# Patient Record
Sex: FEMALE | Race: WHITE | Employment: UNEMPLOYED | URBAN - METROPOLITAN AREA
[De-identification: names, ages, dates, MRNs, and addresses within clinical notes are randomized per-mention and may not be internally consistent; named-entity substitution may affect disease eponyms.]

---

## 2023-02-17 ENCOUNTER — HOSPITAL ENCOUNTER (OUTPATIENT)
Facility: HOSPITAL | Age: 28
Setting detail: OUTPATIENT SURGERY
Discharge: HOME | End: 2023-02-17
Attending: INTERNAL MEDICINE | Admitting: INTERNAL MEDICINE
Payer: COMMERCIAL

## 2023-02-17 VITALS
RESPIRATION RATE: 14 BRPM | HEART RATE: 76 BPM | HEIGHT: 61 IN | DIASTOLIC BLOOD PRESSURE: 56 MMHG | TEMPERATURE: 98.2 F | BODY MASS INDEX: 26.75 KG/M2 | WEIGHT: 141.7 LBS | OXYGEN SATURATION: 100 % | SYSTOLIC BLOOD PRESSURE: 97 MMHG

## 2023-02-17 PROCEDURE — 2709999900 HC NON-CHARGEABLE SUPPLY: Performed by: INTERNAL MEDICINE

## 2023-02-17 PROCEDURE — 7100000010 HC PHASE II RECOVERY - FIRST 15 MIN: Performed by: INTERNAL MEDICINE

## 2023-02-17 PROCEDURE — 3600007502: Performed by: INTERNAL MEDICINE

## 2023-02-17 PROCEDURE — 2580000003 HC RX 258: Performed by: INTERNAL MEDICINE

## 2023-02-17 PROCEDURE — 99152 MOD SED SAME PHYS/QHP 5/>YRS: CPT | Performed by: INTERNAL MEDICINE

## 2023-02-17 PROCEDURE — 6360000002 HC RX W HCPCS: Performed by: INTERNAL MEDICINE

## 2023-02-17 PROCEDURE — 3600007512: Performed by: INTERNAL MEDICINE

## 2023-02-17 PROCEDURE — 99153 MOD SED SAME PHYS/QHP EA: CPT | Performed by: INTERNAL MEDICINE

## 2023-02-17 PROCEDURE — 7100000011 HC PHASE II RECOVERY - ADDTL 15 MIN: Performed by: INTERNAL MEDICINE

## 2023-02-17 RX ORDER — DIPHENHYDRAMINE HYDROCHLORIDE 50 MG/ML
25 INJECTION INTRAMUSCULAR; INTRAVENOUS EVERY 6 HOURS PRN
Status: DISCONTINUED | OUTPATIENT
Start: 2023-02-17 | End: 2023-02-17 | Stop reason: HOSPADM

## 2023-02-17 RX ORDER — SIMETHICONE 20 MG/.3ML
40 EMULSION ORAL EVERY 6 HOURS PRN
Status: DISCONTINUED | OUTPATIENT
Start: 2023-02-17 | End: 2023-02-17 | Stop reason: HOSPADM

## 2023-02-17 RX ORDER — SODIUM CHLORIDE 9 MG/ML
INJECTION, SOLUTION INTRAVENOUS CONTINUOUS
Status: CANCELLED | OUTPATIENT
Start: 2023-02-17

## 2023-02-17 RX ORDER — FLUMAZENIL 0.1 MG/ML
0.2 INJECTION INTRAVENOUS ONCE
Status: DISCONTINUED | OUTPATIENT
Start: 2023-02-17 | End: 2023-02-17 | Stop reason: HOSPADM

## 2023-02-17 RX ORDER — MIDAZOLAM HYDROCHLORIDE 1 MG/ML
5 INJECTION, SOLUTION INTRAMUSCULAR; INTRAVENOUS
Status: DISCONTINUED | OUTPATIENT
Start: 2023-02-17 | End: 2023-02-17 | Stop reason: HOSPADM

## 2023-02-17 RX ORDER — SODIUM CHLORIDE 0.9 % (FLUSH) 0.9 %
5-40 SYRINGE (ML) INJECTION PRN
Status: CANCELLED | OUTPATIENT
Start: 2023-02-17

## 2023-02-17 RX ORDER — FENTANYL CITRATE 50 UG/ML
INJECTION, SOLUTION INTRAMUSCULAR; INTRAVENOUS PRN
Status: DISCONTINUED | OUTPATIENT
Start: 2023-02-17 | End: 2023-02-17 | Stop reason: ALTCHOICE

## 2023-02-17 RX ORDER — SODIUM CHLORIDE 9 MG/ML
25 INJECTION, SOLUTION INTRAVENOUS PRN
Status: CANCELLED | OUTPATIENT
Start: 2023-02-17

## 2023-02-17 RX ORDER — NALOXONE HYDROCHLORIDE 0.4 MG/ML
0.4 INJECTION, SOLUTION INTRAMUSCULAR; INTRAVENOUS; SUBCUTANEOUS PRN
Status: DISCONTINUED | OUTPATIENT
Start: 2023-02-17 | End: 2023-02-17 | Stop reason: HOSPADM

## 2023-02-17 RX ORDER — SODIUM CHLORIDE 0.9 % (FLUSH) 0.9 %
5-40 SYRINGE (ML) INJECTION EVERY 12 HOURS SCHEDULED
Status: CANCELLED | OUTPATIENT
Start: 2023-02-17

## 2023-02-17 RX ORDER — EPINEPHRINE 0.1 MG/ML
1 SYRINGE (ML) INJECTION ONCE
Status: DISCONTINUED | OUTPATIENT
Start: 2023-02-17 | End: 2023-02-17 | Stop reason: HOSPADM

## 2023-02-17 RX ORDER — MIDAZOLAM HYDROCHLORIDE 1 MG/ML
INJECTION INTRAMUSCULAR; INTRAVENOUS PRN
Status: DISCONTINUED | OUTPATIENT
Start: 2023-02-17 | End: 2023-02-17 | Stop reason: ALTCHOICE

## 2023-02-17 RX ORDER — GLYCOPYRROLATE 0.2 MG/ML
0.1 INJECTION INTRAMUSCULAR; INTRAVENOUS ONCE
Status: DISCONTINUED | OUTPATIENT
Start: 2023-02-17 | End: 2023-02-17 | Stop reason: HOSPADM

## 2023-02-17 RX ORDER — SODIUM CHLORIDE 9 MG/ML
INJECTION, SOLUTION INTRAVENOUS CONTINUOUS
Status: DISCONTINUED | OUTPATIENT
Start: 2023-02-17 | End: 2023-02-17 | Stop reason: HOSPADM

## 2023-02-17 RX ORDER — FENTANYL CITRATE 50 UG/ML
100 INJECTION, SOLUTION INTRAMUSCULAR; INTRAVENOUS
Status: DISCONTINUED | OUTPATIENT
Start: 2023-02-17 | End: 2023-02-17 | Stop reason: HOSPADM

## 2023-02-17 RX ADMIN — SODIUM CHLORIDE: 9 INJECTION, SOLUTION INTRAVENOUS at 08:44

## 2023-02-17 ASSESSMENT — PAIN SCALES - GENERAL: PAINLEVEL_OUTOF10: 0

## 2023-02-17 ASSESSMENT — PAIN - FUNCTIONAL ASSESSMENT: PAIN_FUNCTIONAL_ASSESSMENT: 0-10

## 2023-02-17 NOTE — H&P
Assessment/Plan  # Detail Type Description    1. Assessment Bleeding of rectum (K62.5). Impression Pt of King Musa NP, referred to GI for evaluation and treatment of GI alarm features: Rectal bleeding x3 years, and life-long constipation; in context of FHx of colon cancer (PGM). ____________________________________________  *Onset: 3 years (Intermittently)  *C/O: Rectal bleeding- BRBPR w & w/o BM, fills toilet w/o BM on occasion, usually all 3 types (on stool, in bowl, and wipe). Pt attributes rectal bleeding to hemorrhoids. *BM: Once Q2-3 days (life-long)  She states that she experiences increased bleeding with more frequent BM's. She tried drinking fruit smoothies, and was able to have daily BM's, this is when she noticed the external hemorrhoid flare-up, with return of rectal bleeding. OTC hemorrhoid treatments made it bearable, but did not resolve hemorrhoid flare.  -Never had EGD or colonoscopy before. *FHx of (CRC) Colorectal cancer: PGM- Colon cancer. Patient Plan -Labs Ordered: CBC, iron stores (to stratify blood loss) & CMP (baseline)  -First C-scope  _________________________________  *C-scope Plan:  First Colonoscopy ordered with Dr. Rory Coughlin with Golytely bowel prep, and Miralax and stool softeners starting 3 days before prep. *C-scope Risks:  Stressed importance of following all bowel preparation instructions. Explained the procedure to the patient including all risks and benefits. These risks consist of missed lesions on exam, bleeding, and bowel perforation with possible need for admission to the hospital, and in the most extensive of  circumstances, the patient may require surgery. Pt verbalized understanding of these risks and is agreeable with this procedure. Plan Orders CBC With Differential/Platelet to be performed. , Comp Metabolic Panel (14) AU to be performed. , Ferritin, Serum to be performed., Folate (Folic Acid), Serum to be performed., Iron, Serum AU to be performed. and TIBC Panel to be performed. Further diagnostic evaluations ordered today include(s) DIAGNOSTIC COLONOSCOPY to be performed. 2. Assessment Hemorrhoids (K64.9). Impression Patient reports that her most recent episode of rectal bleeding occurred around 2022. No external hemorrhoids noted by PCP with [facility], on 2022. Patient has 2 children at home most recent delivery was 2022. Both babies delivered via . Pt reports that she did have external hemorrhoids during pregnancy, but thought that they resolved. Flare-ups of hemorrhoids are associated with increased stool frequency, perianal burning, itching and pain. 3. Assessment Chronic idiopathic constipation (K59.04). Impression See Above. 4. Assessment Family history of malignant neoplasm of digestive organ (Z80.0). Impression *FHx of (CRC) Colorectal cancer: PGM- Colon cancer  (Possibly additional FHx of colon cancer on Paternal side as well)    *Sister and Maternal-side of family also have hx of anal fissures & fistulas (No known FHx of IBD: Crohn's Dz or Ulcerative Colitis), no known FHx of Celiac Dz. This 33year old  patient was referred by Erwin Daly. This 33year old female presents for Rectal Bleed. History of Present Illness  1. Rectal Bleed   Onset: 3 years ago. Severity level is moderate-severe. Location is Rectal.  Duration varies. Quality:  BRBPR w/ bowel movement, BRBPR w/out bowel movement and wipe-type. It occurs randomly. The problem is unchanged. Associated symptoms include bloating and constipation. Pertinent negatives include abdominal distention, abdominal pain, change in bowel habits, decreased appetite, diarrhea, dysphagia, heartburn, nausea, perirectal itching, rectal pain, rectal pain associated with bleeding, vomiting and weight loss. Additional information: BM irregular (Once Q2-3 days).           Problem List  Problem Description Onset Date Chronic Clinical Status Notes   Anxiety  Y     Depression  Y     Rectal hemorrhage 2022 N     Family history of hereditary nonpolyposis colon cancer 2022 N     Bleeding hemorrhoids 2022 N     Constipation - functional 2022 N       Past Medical/Surgical History   (Detailed)  Disease/disorder Onset Date Management Date Comments      2020        3/1/2022       [facility] [place], [place] 2019           Family History   (Detailed)    Relationship Family Member Name  Age at Death Condition Onset Age Cause of Death   Brother  [de-identified] and well     Brother    ADD/ADHD  N   Brother  N  Leukemia  N   Father  Y 46 anixety  N   Father    lung disease  N   Father  Y 46 Depression  N   Mother  N  Depression  N   Mother    ADD/ADHD  N   Mother  N  anxiety  N   Mother  N  Alive and well     Paternal aunt  Y 46      Paternal aunt    Cancer, breast  N   Paternal grandmother    Cancer, lung  N   Paternal grandmother    Cancer, colon  N   Paternal grandmother  Y       Sister    Depression  N   Sister    anxiety  N   Sister  N  Alive and well     Sister    ADD/ADHD  N   Sister  N  Non-Hodgkin's lymphoma  N     Social History  (Detailed)  Tobacco use reviewed. Preferred language is [place]. Marital Status/Family/Social Support  Marital status:      Tobacco use status: Current non-smoker. Smoking status: Never smoker. Tobacco Screening  Patient has never used tobacco. Patient has not used tobacco in the last 30 days. Patient has not used smokeless tobacco in the last 30 days. Smoking Status  Type Smoking Status Usage Per Day Years Used Pack Years Total Pack Years    Never smoker         Alcohol  There is a history of alcohol use. Caffeine  The patient uses caffeine.       Medications (active prior to today)  Medication Instructions Start Date Stop Date Refilled Elsewhere   Zoloft 25 mg tablet take 1 tablet by oral route  every day 2022 N       Medication Reconciliation  Medications reconciled today. Medication Reviewed  Adherence Medication Name Sig Desc Elsewhere Status   taking as directed Zoloft 25 mg tablet take 1 tablet by oral route  every day N Verified     Medications (Added, Continued or Stopped today)  Start Date Medication Directions PRN Status PRN Reason Instruction Stop Date   07/19/2022 Zoloft 25 mg tablet take 1 tablet by oral route  every day N        Allergies  Ingredient Reaction (Severity) Medication Name Comment   PENICILLIN Anaphylaxis (severe)       Reviewed, no changes. Review of Systems  System Neg/Pos Details   Constitutional Negative Fever and Weight loss. ENMT Negative Dysphagia and Sinus Infection. Eyes Negative Double vision. Respiratory Negative Asthma, Chronic cough and Dyspnea. Cardio Negative Chest pain, Edema and Irregular heartbeat/palpitations. GI Positive Bloating, Constipation. GI Negative Abdominal distention, Abdominal pain, Change in bowel habits, Decreased appetite, Diarrhea, Dysphagia, Heartburn, Hematemesis, Hematochezia, Melena, Nausea, Rectal pain, Rectal pain associated w/ bleeding, Reflux and Vomiting.  Negative Dysuria and Hematuria. Endocrine Negative Cold intolerance and Heat intolerance. Neuro Negative Dizziness, Headache, Numbness and Tremors. Psych Negative Anxiety, Depression and Increased stress. Integumentary Negative Hives, Perirectal itching, Pruritus and Rash. MS Negative Back pain, Joint pain and Myalgia. Hema/Lymph Negative Easy bleeding, Easy bruising and Lymphadenopathy. Allergic/Immuno Negative Food allergies and Immunosuppression.        Vital Signs   Height  Time ft in cm Last Measured Height Position   9:33 AM 5.0 1.00 154.94 07/19/2022 Standing     Weight/BSA/BMI  Time lb oz kg Context BMI kg/m2 BSA m2   9:33 .00  67.132 dressed with shoes 27.96      Date/Time Temp Pulse Resp BP SpO2 O2 Device O2 Flow Rate (L/min) Weight [facility]   02/17/23 0817 98.2 °F (36.8 °C) 86 14 107/69 100 % None (Room air) -- 141 lb 11.2 oz (64.3 kg) CS     Physical  Exam  Exam Findings Details   Constitutional Normal Well developed. Eyes Normal Conjunctiva - Right: Normal, Left: Normal. Sclera - Right: Normal, Left: Normal.   Nasopharynx Normal Lips/teeth/gums - Normal.   Neck Exam Normal Inspection - Normal.   Respiratory Normal Inspection - Normal.   Cardiovascular Normal Regular rate and rhythm. No murmurs, gallops, or rubs. Vascular Normal Pulses - Brachial: Normal.   Skin Normal Inspection - Normal.   Musculoskeletal Normal Hands/Wrist - Right: Normal, Left: Normal.   Extremity Normal No edema. Neurological Normal Fine motor skills - Normal.   Psychiatric Normal Orientation - Oriented to time, place, person & situation. Appropriate mood and affect. Patient Education  # Patient Education   1.  Rectal Bleeding: Care Instructions       Immunizations Entered by History  Date Immunization   2/8/2016 12:00:00 AM adenovirus vaccine, unspecified formulation   2/8/2016 12:00:00 AM MCV4 (11-55 yrs)   2/8/2016 12:00:00 AM Tdap (Adacel)   2/8/2016 12:00:00 AM Hep A and Hep B, adult   3/13/2008 12:00:00 AM varicella virus vaccine   8/7/2015 12:00:00 AM Varicella   11/8/2011 12:00:00 AM poliovirus vaccine, inactivated   6/27/2017 12:00:00 AM M/R   3/28/2016 12:00:00 AM M/R   3/28/2016 12:00:00 AM hepatitis B vaccine, pediatric or pediatric/adolescent dosage   3/28/2016 12:00:00 AM hepatitis A vaccine, adult dosage   1/9/2017 12:00:00 AM Hep A and Hep B, adult   3/20/2017 12:00:00 AM Hep A and Hep B, adult   6/27/2017 12:00:00 AM measles, mumps and rubella virus vaccine   9/20/2018 12:00:00 AM Yellow fever   5/29/2012 12:00:00 AM Typhoid, ViCPs   5/21/2019 12:00:00 AM Human Papillomavirus 9-valent vaccine   1/18/2019 12:00:00 AM HPV (Bivalent)       Active Patient Care Team Members  Name Contact Agency Type Support Role Relationship Active Date Inactive Date Specialty   Vesta Tse   Patient provider PCP   [facility] McFather   encounter provider    Gastroenterology   Patient questioned and examined

## 2023-02-17 NOTE — PROCEDURES
[facility]  Colonoscopy Procedure Report  _______________________________________________________  Patient: Janie Miranda                                        Attending Physician: Josemanuel Root MD    Patient ID: [id number]                                    Referring Physician: Cesia Lemos    Exam Date: 2/17/2023     Introduction: A  33 y.o. female patient, presents for inpatient Colonoscopy    Indications: Pt of Nilda Romero NP, referred to GI for Rectal bleeding x3 years, and life-long constipation; in context of FHx of colon cancer (PGM). Onset: 3 years (Intermittently)  *C/O: Rectal bleeding- BRBPR w & w/o BM, fills toilet w/o BM on occasion, usually all 3 types (on stool, in bowl, and wipe). Pt attributes rectal bleeding to hemorrhoids. *BM: Once Q2-3 days (life-long)  She states that she experiences increased bleeding with more frequent BM's. She tried drinking fruit smoothies, and was able to have daily BM's, this is when she noticed the external hemorrhoid flare-up, with return of rectal bleeding. OTC hemorrhoid treatments made it bearable, but did not resolve hemorrhoid flare. FHx of (CRC) Colorectal cancer: PGM- Colon cancer. She had 2 C section 11 months and 3 years ago    Consent: The benefits, risks, and alternatives to the procedure were discussed and informed consent was obtained from the patient. Preparation: EKG, pulse, pulse oximetry and blood pressure were monitored throughout the procedure. ASA Classification: Class I- . The heart is an S1-S2 and regular heart rate and rhythm. Lungs are clear to auscultation and percussion. Abdomen is soft, nondistended, and nontender. Mental Status: awake, alert, and oriented to person, place, and time    Medications:  Fentanyl 100 mcg IV before procedure. Versed 3 mg IV throughout the procedure. Rectal Exam: Normal Rectal Exam. No Blood. Pathology Specimens:  0    Procedure:   The pediatric colonoscope was passed with ease through the anus under direct visualization and advanced to the cecum and 5 cm inside the terminal ileum. Retroflexion is made in the ascending colon and the rectum. The scope was withdrawn and the mucosa was carefully examined. The quality of the preparation was excellent. The views were excellent. The patient's toleration of the procedure was excellent. The exam was done twice to the cecum. Total time is 19 minutes and withdrawal time is 15 minutes. Findings:    Rectum:   Small internal hemorrhoids. Sigmoid:   normal  Descending Colon:   Normal   Transverse Colon:   Normal   Ascending Colon:   Normal  Cecum:   Normal  Terminal Ileum:   Normal.       Unplanned Events: There were no unplanned events. Estimated Blood Loss: None  IMPLANTS: * No implants in log *  Impressions: Tiny external hemorrhoids. Small internal hemorrhoids. Normal Mucosa. No blood, diverticula, polyps or AVM found. Complications: None; patient tolerated the procedure well. Recommendations:  Discharge home when standard parameters are met. Resume a high fiber diet. Colonoscopy recommendation in 19 years. Take Miralax and/ or Colace 100 mg on regular basis if constipated. It is safer to hold breast feeding until tomorrow morning.     Procedure Codes:    COLONOSCOPY [IRB640]    Endoscope Information:  Model Number(s)    [id number]   Assistant: None  Signed By: Holland Ahumada, MD Date: 2/17/2023

## 2023-02-17 NOTE — DISCHARGE INSTRUCTIONS
Leticia Reji  [id number]  1995    COLON DISCHARGE INSTRUCTIONS    Discomfort:  Redness at IV site- apply warm compress to area; if redness or soreness persist- contact your physician  There may be a slight amount of blood passed from the rectum  Gaseous discomfort- walking, belching will help relieve any discomfort  You may not operate a vehicle til the next day. You may not engage in an occupation involving machinery or appliances til the next day. You may not drink alcoholic beverages til the next day. DIET:   High fiber diet. ACTIVITY:  You may not  resume your normal daily activities til the next day. it is recommended that you spend the remainder of the day resting -  avoid any strenuous activity. CALL M.D.  IF ANY SIGN OF:   Increasing pain, nausea, vomiting  Abdominal distension (swelling)  New increased bleeding (oral or rectal)  Fever (chills)  Pain in chest area  Bloody discharge from nose or mouth  Shortness of breath    You may  take any Advil, Aspirin, Ibuprofen, Motrin, Aleve, or Goodys but preferably  Tylenol as needed for pain. Post procedure diagnosis:  tiny hemorrhoids    Follow-up Instructions: Your follow up colonoscopy will be in 19 years. Vincent Greenwood MD  February 17, 2023       DISCHARGE SUMMARY from Nurse    PATIENT INSTRUCTIONS:    After general anesthesia or intravenous sedation, for 24 hours or while taking prescription Narcotics:  Limit your activities  Do not drive and operate hazardous machinery  Do not make important personal or business decisions  Do  not drink alcoholic beverages  If you have not urinated within 8 hours after discharge, please contact your surgeon on call.     Report the following to your surgeon:  Excessive pain, swelling, redness or odor of or around the surgical area  Temperature over 100.5  Nausea and vomiting lasting longer than 4 hours or if unable to take medications  Any signs of decreased circulation or nerve impairment to extremity: change in color, persistent  numbness, tingling, coldness or increase pain  Any questions    What to do at Home:  Recommended activity: as above,     If you experience any of the following symptoms as above, please follow up with Dr. Estephania Bullock. *  Please give a list of your current medications to your Primary Care Provider. *  Please update this list whenever your medications are discontinued, doses are      changed, or new medications (including over-the-counter products) are added. *  Please carry medication information at all times in case of emergency situations. These are general instructions for a healthy lifestyle:    No smoking/ No tobacco products/ Avoid exposure to second hand smoke  Surgeon General's Warning:  Quitting smoking now greatly reduces serious risk to your health. Obesity, smoking, and sedentary lifestyle greatly increases your risk for illness    A healthy diet, regular physical exercise & weight monitoring are important for maintaining a healthy lifestyle    You may be retaining fluid if you have a history of heart failure or if you experience any of the following symptoms:  Weight gain of 3 pounds or more overnight or 5 pounds in a week, increased swelling in our hands or feet or shortness of breath while lying flat in bed. Please call your doctor as soon as you notice any of these symptoms; do not wait until your next office visit. The discharge information has been reviewed with the patient and spouse. The patient and spouse verbalized understanding. Discharge medications reviewed with the patient and spouse and appropriate educational materials and side effects teaching were provided.     Patient armband removed and shredded    ___________________________________________________________________________________________________________________________________

## 2023-02-17 NOTE — PRE SEDATION
Sedation Pre-Procedure Note    Patient Name: Festus Gonzalez   YOB: 1995  Room/Bed: ENDO/PL  Medical Record Number: [id number]  Date: 2023   Time: 9:07 AM       Indication:  rectal bleeding    Consent: I have discussed with the patient and/or the patient representative the indication, alternatives, and the possible risks and/or complications of the planned procedure and the anesthesia methods. The patient and/or patient representative appear to understand and agree to proceed. Vital Signs:   Vitals:    23 0817   BP: 107/69   Pulse: 86   Resp: 14   Temp: 98.2 °F (36.8 °C)   SpO2: 100%       Past Medical History:   has a past medical history of Asthma. Past Surgical History:   has a past surgical history that includes  section (N/A, );  section (N/A, ); and eye surgery (Bilateral, ). Medications:   Scheduled Meds:    flumazenil  0.2 mg IntraVENous Once    benzocaine   Mouth/Throat 4x Daily    EPINEPHrine  1 mg IntraVENous Once    glycopyrrolate  0.1 mg IntraVENous Once     Continuous Infusions:    sodium chloride 100 mL/hr at 23 0844    fentaNYL      midazolam       PRN Meds: naloxone, simethicone, indomethacin, diphenhydrAMINE  Home Meds:   Prior to Admission medications    Not on File     Coumadin Use Last 7 Days:  no  Antiplatelet drug therapy use last 7 days: no  Other anticoagulant use last 7 days: no  Additional Medication Information:  none      Pre-Sedation Documentation and Exam:   Vital signs have been reviewed (see flow sheet for vitals). I have reviewed the patient's history and review of systems.     Mallampati Airway Assessment:  normal, dentition not prohibitive, normal neck range of motion, Mallampati Class II - (soft palate, fauces & uvula are visible)    Prior History of Anesthesia Complications:   none    ASA Classification:  Class 1 - A normal healthy patient    Sedation/ Anesthesia Plan:   intravenous sedation    Medications Planned: midazolam (Versed) intravenously and fentanyl intravenously    Patient is an appropriate candidate for plan of sedation: yes    Electronically signed by Don Nieto MD on 2/17/2023 at 9:07 AM

## (undated) DEVICE — Device

## (undated) DEVICE — NEEDLE SYR 18GA L1.5IN RED PLAS HUB S STL BLNT FILL W/O

## (undated) DEVICE — SET ADMIN 16ML TBNG L100IN 2 Y INJ SITE IV PIGGY BK DISP (ORDER IN MULIPLES OF 48)

## (undated) DEVICE — TOURNIQUET PHLEB W1XL18IN BLU FLAT RL AND BND REUSE FOR IV

## (undated) DEVICE — SINGLE PORT MANIFOLD: Brand: NEPTUNE 2

## (undated) DEVICE — BRUSH CYTO L240CM DIA2.3MM GI COLONOSCOPY 3 RNG HNDL RADPQ

## (undated) DEVICE — SPONGE GZ W4XL4IN RAYON POLY 4 PLY NONWOVEN FASTER WICKING

## (undated) DEVICE — EJECTOR SALIVA 6 IN FLX CLR

## (undated) DEVICE — ENDO CARRY-ON PROCEDURE KIT INCLUDES ENZYMATIC SPONGE, GAUZE, BIOHAZARD LABEL, TRAY, LUBRICANT, DIRTY SCOPE LABEL, WATER LABEL, TRAY, DRAWSTRING PAD, AND DEFENDO 4-PIECE KIT.: Brand: ENDO CARRY-ON PROCEDURE KIT

## (undated) DEVICE — SYRINGE MED 50ML LUERSLIP TIP

## (undated) DEVICE — TRAP SPEC POLYP REM STRNR CLN DSGN MAGNIFYING WIND DISP

## (undated) DEVICE — SYRINGE MED 3ML CLR PLAS STD N CTRL LUERLOCK TIP DISP

## (undated) DEVICE — KENDALL RADIOLUCENT FOAM MONITORING ELECTRODE RECTANGULAR SHAPE: Brand: KENDALL

## (undated) DEVICE — CATHETER IV 22GA L1IN BLU POLYUR STR HUB RADPQ PROTCT +

## (undated) DEVICE — 4-PORT MANIFOLD: Brand: NEPTUNE 2

## (undated) DEVICE — MOUTHPIECE ENDOSCP L CTRL OPN AND SIDE PORTS DISP